# Patient Record
Sex: MALE | Race: WHITE | NOT HISPANIC OR LATINO
[De-identification: names, ages, dates, MRNs, and addresses within clinical notes are randomized per-mention and may not be internally consistent; named-entity substitution may affect disease eponyms.]

---

## 2016-12-27 RX ORDER — SODIUM CHLORIDE 9 MG/ML
3 INJECTION INTRAMUSCULAR; INTRAVENOUS; SUBCUTANEOUS EVERY 8 HOURS
Qty: 0 | Refills: 0 | Status: DISCONTINUED | OUTPATIENT
Start: 2017-01-17 | End: 2017-01-19

## 2017-01-06 ENCOUNTER — OTHER (OUTPATIENT)
Age: 62
End: 2017-01-06

## 2017-01-10 RX ORDER — TRANEXAMIC ACID 100 MG/ML
850 INJECTION, SOLUTION INTRAVENOUS ONCE
Qty: 0 | Refills: 0 | Status: DISCONTINUED | OUTPATIENT
Start: 2017-01-17 | End: 2017-01-19

## 2017-01-10 RX ORDER — BUPIVACAINE 13.3 MG/ML
20 INJECTION, SUSPENSION, LIPOSOMAL INFILTRATION ONCE
Qty: 0 | Refills: 0 | Status: DISCONTINUED | OUTPATIENT
Start: 2017-01-17 | End: 2017-01-19

## 2017-01-10 RX ORDER — CEFAZOLIN SODIUM 1 G
2000 VIAL (EA) INJECTION ONCE
Qty: 0 | Refills: 0 | Status: DISCONTINUED | OUTPATIENT
Start: 2017-01-17 | End: 2017-01-19

## 2017-01-10 RX ORDER — GABAPENTIN 400 MG/1
600 CAPSULE ORAL ONCE
Qty: 0 | Refills: 0 | Status: COMPLETED | OUTPATIENT
Start: 2017-01-17 | End: 2017-01-17

## 2017-01-10 RX ORDER — OXYCODONE HYDROCHLORIDE 5 MG/1
20 TABLET ORAL ONCE
Qty: 0 | Refills: 0 | Status: DISCONTINUED | OUTPATIENT
Start: 2017-01-17 | End: 2017-01-17

## 2017-01-10 RX ORDER — ACETAMINOPHEN 500 MG
1000 TABLET ORAL ONCE
Qty: 0 | Refills: 0 | Status: DISCONTINUED | OUTPATIENT
Start: 2017-01-17 | End: 2017-01-19

## 2017-01-10 RX ORDER — CELECOXIB 200 MG/1
400 CAPSULE ORAL ONCE
Qty: 0 | Refills: 0 | Status: COMPLETED | OUTPATIENT
Start: 2017-01-17 | End: 2017-01-17

## 2017-01-16 ENCOUNTER — RESULT REVIEW (OUTPATIENT)
Age: 62
End: 2017-01-16

## 2017-01-17 ENCOUNTER — APPOINTMENT (OUTPATIENT)
Dept: ORTHOPEDIC SURGERY | Facility: HOSPITAL | Age: 62
End: 2017-01-17

## 2017-01-17 ENCOUNTER — INPATIENT (INPATIENT)
Facility: HOSPITAL | Age: 62
LOS: 1 days | Discharge: NURSING, REHAB OR LONG-TERM CARE | DRG: 470 | End: 2017-01-19
Attending: ORTHOPAEDIC SURGERY | Admitting: ORTHOPAEDIC SURGERY
Payer: COMMERCIAL

## 2017-01-17 VITALS
RESPIRATION RATE: 16 BRPM | OXYGEN SATURATION: 98 % | HEIGHT: 67.5 IN | SYSTOLIC BLOOD PRESSURE: 137 MMHG | DIASTOLIC BLOOD PRESSURE: 96 MMHG | HEART RATE: 88 BPM | TEMPERATURE: 98 F | WEIGHT: 197.98 LBS

## 2017-01-17 DIAGNOSIS — Z98.890 OTHER SPECIFIED POSTPROCEDURAL STATES: Chronic | ICD-10-CM

## 2017-01-17 DIAGNOSIS — M17.12 UNILATERAL PRIMARY OSTEOARTHRITIS, LEFT KNEE: ICD-10-CM

## 2017-01-17 PROCEDURE — 20985 CPTR-ASST DIR MS PX: CPT | Mod: AS,59

## 2017-01-17 PROCEDURE — 73560 X-RAY EXAM OF KNEE 1 OR 2: CPT | Mod: 26,LT

## 2017-01-17 PROCEDURE — 27447 TOTAL KNEE ARTHROPLASTY: CPT | Mod: AS,LT

## 2017-01-17 PROCEDURE — 20985 CPTR-ASST DIR MS PX: CPT | Mod: 59

## 2017-01-17 PROCEDURE — 88311 DECALCIFY TISSUE: CPT | Mod: 26

## 2017-01-17 PROCEDURE — 27447 TOTAL KNEE ARTHROPLASTY: CPT | Mod: LT

## 2017-01-17 PROCEDURE — 88305 TISSUE EXAM BY PATHOLOGIST: CPT | Mod: 26

## 2017-01-17 RX ORDER — FERROUS SULFATE 325(65) MG
325 TABLET ORAL
Qty: 0 | Refills: 0 | Status: DISCONTINUED | OUTPATIENT
Start: 2017-01-17 | End: 2017-01-19

## 2017-01-17 RX ORDER — ACETAMINOPHEN 500 MG
650 TABLET ORAL EVERY 6 HOURS
Qty: 0 | Refills: 0 | Status: DISCONTINUED | OUTPATIENT
Start: 2017-01-17 | End: 2017-01-19

## 2017-01-17 RX ORDER — KETOROLAC TROMETHAMINE 30 MG/ML
15 SYRINGE (ML) INJECTION EVERY 6 HOURS
Qty: 0 | Refills: 0 | Status: DISCONTINUED | OUTPATIENT
Start: 2017-01-17 | End: 2017-01-18

## 2017-01-17 RX ORDER — SODIUM CHLORIDE 9 MG/ML
1000 INJECTION, SOLUTION INTRAVENOUS
Qty: 0 | Refills: 0 | Status: DISCONTINUED | OUTPATIENT
Start: 2017-01-17 | End: 2017-01-18

## 2017-01-17 RX ORDER — HYDROMORPHONE HYDROCHLORIDE 2 MG/ML
0.5 INJECTION INTRAMUSCULAR; INTRAVENOUS; SUBCUTANEOUS
Qty: 0 | Refills: 0 | Status: DISCONTINUED | OUTPATIENT
Start: 2017-01-17 | End: 2017-01-17

## 2017-01-17 RX ORDER — DOCUSATE SODIUM 100 MG
100 CAPSULE ORAL THREE TIMES A DAY
Qty: 0 | Refills: 0 | Status: DISCONTINUED | OUTPATIENT
Start: 2017-01-17 | End: 2017-01-19

## 2017-01-17 RX ORDER — METOPROLOL TARTRATE 50 MG
50 TABLET ORAL DAILY
Qty: 0 | Refills: 0 | Status: DISCONTINUED | OUTPATIENT
Start: 2017-01-17 | End: 2017-01-19

## 2017-01-17 RX ORDER — FOLIC ACID 0.8 MG
1 TABLET ORAL DAILY
Qty: 0 | Refills: 0 | Status: DISCONTINUED | OUTPATIENT
Start: 2017-01-17 | End: 2017-01-19

## 2017-01-17 RX ORDER — OXYCODONE HYDROCHLORIDE 5 MG/1
10 TABLET ORAL EVERY 12 HOURS
Qty: 0 | Refills: 0 | Status: DISCONTINUED | OUTPATIENT
Start: 2017-01-17 | End: 2017-01-19

## 2017-01-17 RX ORDER — OXYCODONE HYDROCHLORIDE 5 MG/1
5 TABLET ORAL
Qty: 0 | Refills: 0 | Status: DISCONTINUED | OUTPATIENT
Start: 2017-01-17 | End: 2017-01-19

## 2017-01-17 RX ORDER — VANCOMYCIN HCL 1 G
1250 VIAL (EA) INTRAVENOUS ONCE
Qty: 0 | Refills: 0 | Status: COMPLETED | OUTPATIENT
Start: 2017-01-17 | End: 2017-01-17

## 2017-01-17 RX ORDER — NICOTINE POLACRILEX 2 MG
1 GUM BUCCAL DAILY
Qty: 0 | Refills: 0 | Status: DISCONTINUED | OUTPATIENT
Start: 2017-01-17 | End: 2017-01-19

## 2017-01-17 RX ORDER — OXYCODONE HYDROCHLORIDE 5 MG/1
10 TABLET ORAL
Qty: 0 | Refills: 0 | Status: DISCONTINUED | OUTPATIENT
Start: 2017-01-17 | End: 2017-01-19

## 2017-01-17 RX ORDER — ONDANSETRON 8 MG/1
4 TABLET, FILM COATED ORAL ONCE
Qty: 0 | Refills: 0 | Status: COMPLETED | OUTPATIENT
Start: 2017-01-17 | End: 2017-01-17

## 2017-01-17 RX ORDER — HYDROMORPHONE HYDROCHLORIDE 2 MG/ML
0.5 INJECTION INTRAMUSCULAR; INTRAVENOUS; SUBCUTANEOUS EVERY 4 HOURS
Qty: 0 | Refills: 0 | Status: DISCONTINUED | OUTPATIENT
Start: 2017-01-17 | End: 2017-01-19

## 2017-01-17 RX ORDER — VANCOMYCIN HCL 1 G
1250 VIAL (EA) INTRAVENOUS
Qty: 0 | Refills: 0 | Status: COMPLETED | OUTPATIENT
Start: 2017-01-17 | End: 2017-01-17

## 2017-01-17 RX ORDER — MAGNESIUM HYDROXIDE 400 MG/1
30 TABLET, CHEWABLE ORAL DAILY
Qty: 0 | Refills: 0 | Status: DISCONTINUED | OUTPATIENT
Start: 2017-01-17 | End: 2017-01-19

## 2017-01-17 RX ORDER — ASPIRIN/CALCIUM CARB/MAGNESIUM 324 MG
325 TABLET ORAL
Qty: 0 | Refills: 0 | Status: DISCONTINUED | OUTPATIENT
Start: 2017-01-18 | End: 2017-01-19

## 2017-01-17 RX ORDER — ACETAMINOPHEN 500 MG
975 TABLET ORAL EVERY 8 HOURS
Qty: 0 | Refills: 0 | Status: DISCONTINUED | OUTPATIENT
Start: 2017-01-17 | End: 2017-01-19

## 2017-01-17 RX ORDER — SODIUM CHLORIDE 9 MG/ML
1000 INJECTION, SOLUTION INTRAVENOUS
Qty: 0 | Refills: 0 | Status: DISCONTINUED | OUTPATIENT
Start: 2017-01-17 | End: 2017-01-17

## 2017-01-17 RX ORDER — CEFAZOLIN SODIUM 1 G
2000 VIAL (EA) INJECTION
Qty: 0 | Refills: 0 | Status: COMPLETED | OUTPATIENT
Start: 2017-01-17 | End: 2017-01-17

## 2017-01-17 RX ORDER — SENNA PLUS 8.6 MG/1
2 TABLET ORAL AT BEDTIME
Qty: 0 | Refills: 0 | Status: DISCONTINUED | OUTPATIENT
Start: 2017-01-17 | End: 2017-01-19

## 2017-01-17 RX ORDER — CELECOXIB 200 MG/1
200 CAPSULE ORAL
Qty: 0 | Refills: 0 | Status: DISCONTINUED | OUTPATIENT
Start: 2017-01-19 | End: 2017-01-19

## 2017-01-17 RX ORDER — ZOLPIDEM TARTRATE 10 MG/1
5 TABLET ORAL AT BEDTIME
Qty: 0 | Refills: 0 | Status: DISCONTINUED | OUTPATIENT
Start: 2017-01-17 | End: 2017-01-19

## 2017-01-17 RX ORDER — FAMOTIDINE 10 MG/ML
40 INJECTION INTRAVENOUS AT BEDTIME
Qty: 0 | Refills: 0 | Status: DISCONTINUED | OUTPATIENT
Start: 2017-01-17 | End: 2017-01-19

## 2017-01-17 RX ORDER — ONDANSETRON 8 MG/1
4 TABLET, FILM COATED ORAL EVERY 6 HOURS
Qty: 0 | Refills: 0 | Status: DISCONTINUED | OUTPATIENT
Start: 2017-01-17 | End: 2017-01-19

## 2017-01-17 RX ADMIN — HYDROMORPHONE HYDROCHLORIDE 0.5 MILLIGRAM(S): 2 INJECTION INTRAMUSCULAR; INTRAVENOUS; SUBCUTANEOUS at 15:18

## 2017-01-17 RX ADMIN — Medication 975 MILLIGRAM(S): at 21:31

## 2017-01-17 RX ADMIN — Medication 100 MILLIGRAM(S): at 18:22

## 2017-01-17 RX ADMIN — HYDROMORPHONE HYDROCHLORIDE 0.5 MILLIGRAM(S): 2 INJECTION INTRAMUSCULAR; INTRAVENOUS; SUBCUTANEOUS at 15:25

## 2017-01-17 RX ADMIN — Medication 15 MILLIGRAM(S): at 18:22

## 2017-01-17 RX ADMIN — ONDANSETRON 4 MILLIGRAM(S): 8 TABLET, FILM COATED ORAL at 18:22

## 2017-01-17 RX ADMIN — HYDROMORPHONE HYDROCHLORIDE 0.5 MILLIGRAM(S): 2 INJECTION INTRAMUSCULAR; INTRAVENOUS; SUBCUTANEOUS at 15:52

## 2017-01-17 RX ADMIN — HYDROMORPHONE HYDROCHLORIDE 0.5 MILLIGRAM(S): 2 INJECTION INTRAMUSCULAR; INTRAVENOUS; SUBCUTANEOUS at 15:29

## 2017-01-17 RX ADMIN — OXYCODONE HYDROCHLORIDE 10 MILLIGRAM(S): 5 TABLET ORAL at 18:23

## 2017-01-17 RX ADMIN — Medication 975 MILLIGRAM(S): at 21:32

## 2017-01-17 RX ADMIN — GABAPENTIN 600 MILLIGRAM(S): 400 CAPSULE ORAL at 10:57

## 2017-01-17 RX ADMIN — HYDROMORPHONE HYDROCHLORIDE 0.5 MILLIGRAM(S): 2 INJECTION INTRAMUSCULAR; INTRAVENOUS; SUBCUTANEOUS at 15:00

## 2017-01-17 RX ADMIN — Medication 166.67 MILLIGRAM(S): at 11:06

## 2017-01-17 RX ADMIN — OXYCODONE HYDROCHLORIDE 10 MILLIGRAM(S): 5 TABLET ORAL at 18:45

## 2017-01-17 RX ADMIN — FAMOTIDINE 40 MILLIGRAM(S): 10 INJECTION INTRAVENOUS at 21:31

## 2017-01-17 RX ADMIN — HYDROMORPHONE HYDROCHLORIDE 0.5 MILLIGRAM(S): 2 INJECTION INTRAMUSCULAR; INTRAVENOUS; SUBCUTANEOUS at 15:50

## 2017-01-17 RX ADMIN — Medication 100 MILLIGRAM(S): at 23:46

## 2017-01-17 RX ADMIN — CELECOXIB 400 MILLIGRAM(S): 200 CAPSULE ORAL at 11:30

## 2017-01-17 RX ADMIN — OXYCODONE HYDROCHLORIDE 20 MILLIGRAM(S): 5 TABLET ORAL at 11:30

## 2017-01-17 RX ADMIN — SODIUM CHLORIDE 3 MILLILITER(S): 9 INJECTION INTRAMUSCULAR; INTRAVENOUS; SUBCUTANEOUS at 21:32

## 2017-01-17 RX ADMIN — Medication 1 PATCH: at 21:30

## 2017-01-17 RX ADMIN — HYDROMORPHONE HYDROCHLORIDE 0.5 MILLIGRAM(S): 2 INJECTION INTRAMUSCULAR; INTRAVENOUS; SUBCUTANEOUS at 15:10

## 2017-01-17 RX ADMIN — ONDANSETRON 4 MILLIGRAM(S): 8 TABLET, FILM COATED ORAL at 14:59

## 2017-01-17 RX ADMIN — Medication 100 MILLIGRAM(S): at 21:31

## 2017-01-17 RX ADMIN — OXYCODONE HYDROCHLORIDE 20 MILLIGRAM(S): 5 TABLET ORAL at 10:56

## 2017-01-17 RX ADMIN — SODIUM CHLORIDE 100 MILLILITER(S): 9 INJECTION, SOLUTION INTRAVENOUS at 14:59

## 2017-01-17 RX ADMIN — CELECOXIB 400 MILLIGRAM(S): 200 CAPSULE ORAL at 10:56

## 2017-01-17 RX ADMIN — Medication 15 MILLIGRAM(S): at 18:45

## 2017-01-17 NOTE — PHYSICAL THERAPY INITIAL EVALUATION ADULT - ADDITIONAL COMMENTS
Pt lives in a private home with 2 roommates, No steps to enter, no steps inside. Pt was independent PTA without assist device. Pt owns [employer].

## 2017-01-17 NOTE — PROGRESS NOTE ADULT - SUBJECTIVE AND OBJECTIVE BOX
JENNIFER MENDES  [id number]    History: 61y Male is status post left total knee arthroplasty on 1/17/17, POD # 0. Patient is doing well and is comfortable. The patient's pain is controlled using the prescribed pain medications. The patient is participating in physical therapy. Denies nausea, vomiting, chest pain, shortness of breath, abdominal pain or fever. No new complaints.    MEDICATIONS  (STANDING):  sodium chloride 0.9% lock flush 3milliLiter(s) IV Push every 8 hours  tranexamic acid IVPB 850milliGRAM(s) IV Intermittent once  tranexamic acid IVPB 850milliGRAM(s) IV Intermittent once  BUpivacaine liposome 1.3% Injectable 20milliLiter(s) Local Injection once  acetaminophen  IVPB. 1000milliGRAM(s) IV Intermittent once  ceFAZolin   IVPB 2000milliGRAM(s) IV Intermittent once  lactated ringers. 1000milliLiter(s) IV Continuous <Continuous>  acetaminophen   Tablet. 975milliGRAM(s) Oral every 8 hours  ketorolac   Injectable 15milliGRAM(s) IV Push every 6 hours  docusate sodium 100milliGRAM(s) Oral three times a day  ferrous    sulfate 325milliGRAM(s) Oral three times a day with meals  folic acid 1milliGRAM(s) Oral daily  multivitamin 1Tablet(s) Oral daily  oxyCODONE ER Tablet 10milliGRAM(s) Oral every 12 hours  metoprolol 50milliGRAM(s) Oral daily  famotidine    Tablet 40milliGRAM(s) Oral at bedtime  vancomycin  IVPB 1250milliGRAM(s) IV Intermittent <User Schedule>  ceFAZolin   IVPB 2000milliGRAM(s) IV Intermittent <User Schedule>  nicotine -  14 mG/24Hr(s) Patch 1patch Transdermal daily    MEDICATIONS  (PRN):  acetaminophen   Tablet 650milliGRAM(s) Oral every 6 hours PRN For Temp over 38.3 C (100.94 F)  oxyCODONE IR 5milliGRAM(s) Oral every 3 hours PRN Mild Pain  oxyCODONE IR 10milliGRAM(s) Oral every 3 hours PRN Moderate Pain  HYDROmorphone  Injectable 0.5milliGRAM(s) IV Push every 4 hours PRN Severe Pain/breakthough pain  aluminum hydroxide/magnesium hydroxide/simethicone Suspension 30milliLiter(s) Oral four times a day PRN Indigestion  ondansetron Injectable 4milliGRAM(s) IV Push every 6 hours PRN Nausea and/or Vomiting  magnesium hydroxide Suspension 30milliLiter(s) Oral daily PRN Constipation  senna 2Tablet(s) Oral at bedtime PRN Constipation  zolpidem 5milliGRAM(s) Oral at bedtime PRN Insomnia  zolpidem 5milliGRAM(s) Oral at bedtime PRN Insomnia      Physical exam: The left knee dressing is clean, dry and intact. No drainage or discharge. The calf is supple nontender. Sensation to light touch is grossly intact distally. Motor function distally is 5/5. Extensor hallucis longus and flexor hallucis longus are intact. No foot drop. 2+ dorsalis pedis pulse. Capillary refill is less than 2 seconds. No cyanosis.    Primary Orthopedic Assessment:  •	s/p LEFT total knee replacement    Plan:   •	DVT prophylaxis as prescribed, including use of compression devices and ankle pumps  •	Continue physical therapy  •	Weightbearing as tolerated of the left lower extremity with assistance of a walker  •	Incentive spirometry encouraged  •	Pain control as clinically indicated  •	Discharge planning

## 2017-01-17 NOTE — PHYSICAL THERAPY INITIAL EVALUATION ADULT - RANGE OF MOTION EXAMINATION, REHAB EVAL
Right LE ROM was WFL (within functional limits)/bilateral upper extremity ROM was WFL (within functional limits)/left knee lacking approx 15-20 degrees extension to approx 65-70 degrees flexion

## 2017-01-17 NOTE — PHYSICAL THERAPY INITIAL EVALUATION ADULT - CRITERIA FOR SKILLED THERAPEUTIC INTERVENTIONS
home with home PT, RW (pending progress)/impairments found/anticipated discharge recommendation/anticipated equipment needs at discharge

## 2017-01-17 NOTE — PHYSICAL THERAPY INITIAL EVALUATION ADULT - GENERAL OBSERVATIONS, REHAB EVAL
pt received supine on stretcher in PACU, + telemetry + IV + O2nc + Venous Compression Boots pt c/o 7.5/10 pain, agreeable to PT

## 2017-01-18 ENCOUNTER — TRANSCRIPTION ENCOUNTER (OUTPATIENT)
Age: 62
End: 2017-01-18

## 2017-01-18 DIAGNOSIS — M17.12 UNILATERAL PRIMARY OSTEOARTHRITIS, LEFT KNEE: ICD-10-CM

## 2017-01-18 DIAGNOSIS — F17.200 NICOTINE DEPENDENCE, UNSPECIFIED, UNCOMPLICATED: ICD-10-CM

## 2017-01-18 DIAGNOSIS — K21.9 GASTRO-ESOPHAGEAL REFLUX DISEASE WITHOUT ESOPHAGITIS: ICD-10-CM

## 2017-01-18 DIAGNOSIS — F31.9 BIPOLAR DISORDER, UNSPECIFIED: ICD-10-CM

## 2017-01-18 DIAGNOSIS — G47.33 OBSTRUCTIVE SLEEP APNEA (ADULT) (PEDIATRIC): ICD-10-CM

## 2017-01-18 DIAGNOSIS — R79.89 OTHER SPECIFIED ABNORMAL FINDINGS OF BLOOD CHEMISTRY: ICD-10-CM

## 2017-01-18 DIAGNOSIS — D62 ACUTE POSTHEMORRHAGIC ANEMIA: ICD-10-CM

## 2017-01-18 DIAGNOSIS — D72.829 ELEVATED WHITE BLOOD CELL COUNT, UNSPECIFIED: ICD-10-CM

## 2017-01-18 DIAGNOSIS — I10 ESSENTIAL (PRIMARY) HYPERTENSION: ICD-10-CM

## 2017-01-18 DIAGNOSIS — Z96.652 PRESENCE OF LEFT ARTIFICIAL KNEE JOINT: ICD-10-CM

## 2017-01-18 LAB
ANION GAP SERPL CALC-SCNC: 14 MMOL/L — SIGNIFICANT CHANGE UP (ref 5–17)
BLD GP AB SCN SERPL QL: SIGNIFICANT CHANGE UP
BUN SERPL-MCNC: 18 MG/DL — SIGNIFICANT CHANGE UP (ref 8–20)
CALCIUM SERPL-MCNC: 8.9 MG/DL — SIGNIFICANT CHANGE UP (ref 8.6–10.2)
CHLORIDE SERPL-SCNC: 98 MMOL/L — SIGNIFICANT CHANGE UP (ref 98–107)
CO2 SERPL-SCNC: 25 MMOL/L — SIGNIFICANT CHANGE UP (ref 22–29)
CREAT SERPL-MCNC: 1.39 MG/DL — HIGH (ref 0.5–1.3)
GLUCOSE SERPL-MCNC: 107 MG/DL — SIGNIFICANT CHANGE UP (ref 70–115)
HCT VFR BLD CALC: 32.3 % — LOW (ref 42–52)
HGB BLD-MCNC: 11.2 G/DL — LOW (ref 14–18)
MCHC RBC-ENTMCNC: 31.5 PG — HIGH (ref 27–31)
MCHC RBC-ENTMCNC: 34.7 G/DL — SIGNIFICANT CHANGE UP (ref 32–36)
MCV RBC AUTO: 90.7 FL — SIGNIFICANT CHANGE UP (ref 80–94)
PLATELET # BLD AUTO: 227 K/UL — SIGNIFICANT CHANGE UP (ref 150–400)
POTASSIUM SERPL-MCNC: 3.8 MMOL/L — SIGNIFICANT CHANGE UP (ref 3.5–5.3)
POTASSIUM SERPL-SCNC: 3.8 MMOL/L — SIGNIFICANT CHANGE UP (ref 3.5–5.3)
RBC # BLD: 3.56 M/UL — LOW (ref 4.6–6.2)
RBC # FLD: 12.6 % — SIGNIFICANT CHANGE UP (ref 11–15.6)
SODIUM SERPL-SCNC: 137 MMOL/L — SIGNIFICANT CHANGE UP (ref 135–145)
TYPE + AB SCN PNL BLD: SIGNIFICANT CHANGE UP
WBC # BLD: 13.97 K/UL — HIGH (ref 4.8–10.8)
WBC # FLD AUTO: 13.97 K/UL — HIGH (ref 4.8–10.8)

## 2017-01-18 PROCEDURE — 99223 1ST HOSP IP/OBS HIGH 75: CPT

## 2017-01-18 RX ORDER — NICOTINE POLACRILEX 2 MG
1 GUM BUCCAL DAILY
Qty: 0 | Refills: 0 | Status: CANCELLED | OUTPATIENT
Start: 2017-12-17 | End: 2017-01-19

## 2017-01-18 RX ORDER — ASPIRIN/CALCIUM CARB/MAGNESIUM 324 MG
1 TABLET ORAL
Qty: 84 | Refills: 0 | OUTPATIENT
Start: 2017-01-18

## 2017-01-18 RX ORDER — INFLUENZA VIRUS VACCINE 15; 15; 15; 15 UG/.5ML; UG/.5ML; UG/.5ML; UG/.5ML
0.5 SUSPENSION INTRAMUSCULAR ONCE
Qty: 0 | Refills: 0 | Status: DISCONTINUED | OUTPATIENT
Start: 2017-01-18 | End: 2017-01-19

## 2017-01-18 RX ORDER — CALCIUM CARBONATE 500(1250)
2 TABLET ORAL EVERY 4 HOURS
Qty: 0 | Refills: 0 | Status: DISCONTINUED | OUTPATIENT
Start: 2017-01-18 | End: 2017-01-19

## 2017-01-18 RX ORDER — OXYCODONE HYDROCHLORIDE 5 MG/1
1 TABLET ORAL
Qty: 60 | Refills: 0 | OUTPATIENT
Start: 2017-01-18

## 2017-01-18 RX ORDER — DOCUSATE SODIUM 100 MG
1 CAPSULE ORAL
Qty: 60 | Refills: 0 | OUTPATIENT
Start: 2017-01-18

## 2017-01-18 RX ORDER — SODIUM CHLORIDE 9 MG/ML
1000 INJECTION, SOLUTION INTRAVENOUS
Qty: 0 | Refills: 0 | Status: DISCONTINUED | OUTPATIENT
Start: 2017-01-18 | End: 2017-01-19

## 2017-01-18 RX ADMIN — HYDROMORPHONE HYDROCHLORIDE 0.5 MILLIGRAM(S): 2 INJECTION INTRAMUSCULAR; INTRAVENOUS; SUBCUTANEOUS at 14:39

## 2017-01-18 RX ADMIN — SODIUM CHLORIDE 3 MILLILITER(S): 9 INJECTION INTRAMUSCULAR; INTRAVENOUS; SUBCUTANEOUS at 20:06

## 2017-01-18 RX ADMIN — Medication 325 MILLIGRAM(S): at 09:26

## 2017-01-18 RX ADMIN — Medication 975 MILLIGRAM(S): at 05:13

## 2017-01-18 RX ADMIN — Medication 100 MILLIGRAM(S): at 20:08

## 2017-01-18 RX ADMIN — Medication 325 MILLIGRAM(S): at 05:11

## 2017-01-18 RX ADMIN — SODIUM CHLORIDE 100 MILLILITER(S): 9 INJECTION, SOLUTION INTRAVENOUS at 17:34

## 2017-01-18 RX ADMIN — OXYCODONE HYDROCHLORIDE 10 MILLIGRAM(S): 5 TABLET ORAL at 11:21

## 2017-01-18 RX ADMIN — Medication 15 MILLIGRAM(S): at 00:25

## 2017-01-18 RX ADMIN — FAMOTIDINE 40 MILLIGRAM(S): 10 INJECTION INTRAVENOUS at 20:08

## 2017-01-18 RX ADMIN — Medication 975 MILLIGRAM(S): at 13:38

## 2017-01-18 RX ADMIN — Medication 975 MILLIGRAM(S): at 14:39

## 2017-01-18 RX ADMIN — Medication 975 MILLIGRAM(S): at 20:09

## 2017-01-18 RX ADMIN — SODIUM CHLORIDE 100 MILLILITER(S): 9 INJECTION, SOLUTION INTRAVENOUS at 05:13

## 2017-01-18 RX ADMIN — OXYCODONE HYDROCHLORIDE 10 MILLIGRAM(S): 5 TABLET ORAL at 05:11

## 2017-01-18 RX ADMIN — HYDROMORPHONE HYDROCHLORIDE 0.5 MILLIGRAM(S): 2 INJECTION INTRAMUSCULAR; INTRAVENOUS; SUBCUTANEOUS at 13:13

## 2017-01-18 RX ADMIN — Medication 975 MILLIGRAM(S): at 20:11

## 2017-01-18 RX ADMIN — Medication 325 MILLIGRAM(S): at 17:32

## 2017-01-18 RX ADMIN — OXYCODONE HYDROCHLORIDE 10 MILLIGRAM(S): 5 TABLET ORAL at 19:17

## 2017-01-18 RX ADMIN — Medication 1 PATCH: at 20:11

## 2017-01-18 RX ADMIN — Medication 1 PATCH: at 13:37

## 2017-01-18 RX ADMIN — Medication 1 TABLET(S): at 11:23

## 2017-01-18 RX ADMIN — Medication 325 MILLIGRAM(S): at 13:21

## 2017-01-18 RX ADMIN — SODIUM CHLORIDE 3 MILLILITER(S): 9 INJECTION INTRAMUSCULAR; INTRAVENOUS; SUBCUTANEOUS at 13:40

## 2017-01-18 RX ADMIN — Medication 100 MILLIGRAM(S): at 13:37

## 2017-01-18 RX ADMIN — Medication 15 MILLIGRAM(S): at 00:23

## 2017-01-18 RX ADMIN — SODIUM CHLORIDE 100 MILLILITER(S): 9 INJECTION, SOLUTION INTRAVENOUS at 20:07

## 2017-01-18 RX ADMIN — OXYCODONE HYDROCHLORIDE 10 MILLIGRAM(S): 5 TABLET ORAL at 17:32

## 2017-01-18 RX ADMIN — Medication 100 MILLIGRAM(S): at 05:11

## 2017-01-18 RX ADMIN — OXYCODONE HYDROCHLORIDE 10 MILLIGRAM(S): 5 TABLET ORAL at 20:08

## 2017-01-18 RX ADMIN — OXYCODONE HYDROCHLORIDE 10 MILLIGRAM(S): 5 TABLET ORAL at 12:00

## 2017-01-18 RX ADMIN — OXYCODONE HYDROCHLORIDE 10 MILLIGRAM(S): 5 TABLET ORAL at 05:13

## 2017-01-18 RX ADMIN — Medication 30 MILLILITER(S): at 16:43

## 2017-01-18 RX ADMIN — SODIUM CHLORIDE 3 MILLILITER(S): 9 INJECTION INTRAMUSCULAR; INTRAVENOUS; SUBCUTANEOUS at 05:13

## 2017-01-18 RX ADMIN — OXYCODONE HYDROCHLORIDE 10 MILLIGRAM(S): 5 TABLET ORAL at 20:43

## 2017-01-18 RX ADMIN — Medication 1 MILLIGRAM(S): at 11:23

## 2017-01-18 RX ADMIN — Medication 166.67 MILLIGRAM(S): at 00:23

## 2017-01-18 RX ADMIN — Medication 975 MILLIGRAM(S): at 05:12

## 2017-01-18 NOTE — DISCHARGE NOTE ADULT - MEDICATION SUMMARY - MEDICATIONS TO TAKE
I will START or STAY ON the medications listed below when I get home from the hospital:    dme  -- rolling walker  -- Indication: For assistance    dme  -- 3 in 1 commode  -- Indication: For assistance    oxyCODONE 5 mg oral tablet  -- 1-2 tab(s) by mouth every 4 to 6 hours, As Needed, Pain MDD:8  -- Indication: For Pain    aspirin 325 mg oral delayed release tablet  -- 1 tab(s) by mouth 2 times a day  -- Indication: For dvtp    Ambien 10 mg oral tablet  -- 1 tab(s) by mouth once a day (at bedtime)  -- Indication: For home med    metoprolol tartrate 50 mg oral tablet  -- 1 tab(s) by mouth once a day  -- Indication: For home med    Pepcid 40 mg oral tablet  -- 1 tab(s) by mouth once a day (at bedtime)  -- Indication: For home med    docusate sodium 100 mg oral capsule  -- 1 cap(s) by mouth 2 times a day  -- Indication: For constipation

## 2017-01-18 NOTE — DISCHARGE NOTE ADULT - CARE PLAN
Principal Discharge DX:	Status post total left knee replacement  Goal:	perform ADL's without difficulty or limitation  Instructions for follow-up, activity and diet:	The patient will be seen in the office in 3 weeks for wound check. Tape will be removed at that time. Patient may shower after post-op day #3. The dressing is to be removed on Post- op day#10. IF THE DRESSING BECOMES SOILED BEFORE THE REMOVAL DATE, CHANGE WITH A SIMILAR DRESSING. IF THE DRESSING BECOMES STAINED WITH DISCHARGE, CONTACT THE OFFICE FOR FURTHER DIRECTIONS. The patient will contact the office if the wound becomes red, has increasing pain, develops bleeding or discharge, an injury occurs, or has other concerns. The patient will continue PT consistent with total knee replacement. The patient will continue aspirin 325mg twice daily for 6 weeks for blood clot prevention. The patient will take OXYCODONE AND TYLENOL for pain control and titrate according to prescription and patient needs. The patient will take Colace while taking oxycodone to prevent narcotic associated constipation.  Additionally, increase water intake (drink at least 8 glasses of water daily) and try adding fiber to the diet by eating fruits, vegetables and foods that are rich in grains. If constipation is experienced, contact the medical/primary care provider to discuss further treatment options. The patient is FULL weight bearing. Elevation of the lower leg is recommended to reduce swelling.

## 2017-01-18 NOTE — DISCHARGE NOTE ADULT - PLAN OF CARE
perform ADL's without difficulty or limitation The patient will be seen in the office in 3 weeks for wound check. Tape will be removed at that time. Patient may shower after post-op day #3. The dressing is to be removed on Post- op day#10. IF THE DRESSING BECOMES SOILED BEFORE THE REMOVAL DATE, CHANGE WITH A SIMILAR DRESSING. IF THE DRESSING BECOMES STAINED WITH DISCHARGE, CONTACT THE OFFICE FOR FURTHER DIRECTIONS. The patient will contact the office if the wound becomes red, has increasing pain, develops bleeding or discharge, an injury occurs, or has other concerns. The patient will continue PT consistent with total knee replacement. The patient will continue aspirin 325mg twice daily for 6 weeks for blood clot prevention. The patient will take OXYCODONE AND TYLENOL for pain control and titrate according to prescription and patient needs. The patient will take Colace while taking oxycodone to prevent narcotic associated constipation.  Additionally, increase water intake (drink at least 8 glasses of water daily) and try adding fiber to the diet by eating fruits, vegetables and foods that are rich in grains. If constipation is experienced, contact the medical/primary care provider to discuss further treatment options. The patient is FULL weight bearing. Elevation of the lower leg is recommended to reduce swelling.

## 2017-01-18 NOTE — DISCHARGE NOTE ADULT - PATIENT PORTAL LINK FT
“You can access the Follow[employer] Patient Portal, offered by [facility], by registering with the following website: [email or web address]”

## 2017-01-18 NOTE — DISCHARGE NOTE ADULT - HOSPITAL COURSE
The patient underwent a LEFT TOTAL KNEE REPLACEMENT on 1/17/17. The patient received antibiotics consistent with SCIP guidelines. The patient underwent the procedure and had no intra-operative complications. Post-operatively, the patient was seen by medicine and PT. The patient received ASPIRIN for DVTP. The patient received pain medications per orthopedic pain managment protocol and the pain was appropriately controlled. The patient did not have any post-operative medical complications. The patient was discharged in stable condition.

## 2017-01-18 NOTE — DISCHARGE NOTE ADULT - MEDICATION SUMMARY - MEDICATIONS TO STOP TAKING
I will STOP taking the medications listed below when I get home from the hospital:    ibuprofen 800 mg oral tablet  -- 1 tab(s) by mouth 3 times a day

## 2017-01-18 NOTE — PROGRESS NOTE ADULT - SUBJECTIVE AND OBJECTIVE BOX
JENNIFER MENDES  [id number]    History: 61y Male is status post left total knee arthroplasty on 1/17/2017, POD # 01. Patient is doing well and is comfortable. The patient's pain is controlled using the prescribed pain medications. The patient was not feeling up to participating in PT yesterday.  Pt was c/o nausea yesterday but this has resolved.  Denies vomiting, chest pain, shortness of breath, abdominal pain or fever. No new complaints.                              11.2   13.97 )-----------( 227      ( 18 Jan 2017 05:58 )             32.3     18 Jan 2017 05:58    137    |  98     |  18.0   ----------------------------<  107    3.8     |  25.0   |  1.39     Ca    8.9        18 Jan 2017 05:58        Vital Signs Last 24 Hrs  T(C): 36.7, Max: 36.9 (01-17 @ 16:51)  T(F): 98, Max: 98.5 (01-17 @ 16:51)  HR: 71 (71 - 91)  BP: 106/59 (106/59 - 162/66)  BP(mean): 94 (85 - 94)  RR: 16 (14 - 18)  SpO2: 100% (94% - 100%)      Physical exam: The left knee dressing is clean, dry and intact. No drainage or discharge. No erythema is noted. No blistering. No ecchymosis. The calf is supple nontender. Passive range of motion is acceptable to due postoperative pain. No calf tenderness. Sensation to light touch is grossly intact distally. Motor function distally is 5/5. Extensor hallucis longus and flexor hallucis longus are intact. No foot drop. 2+ dorsalis pedis pulse. Capillary refill is less than 2 seconds. No cyanosis.    Primary Orthopedic Assessment:  •	s/p LEFT total knee replacement pod #1    •	    Plan:   •	DVT prophylaxis as prescribed, including use of compression devices and ankle pumps  •	Continue physical therapy  •	Weightbearing as tolerated of the right lower extremity with assistance of a walker  •	Incentive spirometry encouraged  •	Pain control as clinically indicated  •	Discharge planning – anticipated discharge is Home Thursday/Friday

## 2017-01-18 NOTE — DISCHARGE NOTE ADULT - CARE PROVIDER_API CALL
Swapnil Church), Orthopaedic Surgery  [address] [place] [address]  Phone: [phone]  Fax: [phone]

## 2017-01-18 NOTE — DISCHARGE NOTE ADULT - CARE PROVIDERS DIRECT ADDRESSES
[email or web address]

## 2017-01-18 NOTE — CONSULT NOTE ADULT - SUBJECTIVE AND OBJECTIVE BOX
PATIENT SEEN AND EXAMINED . S/P L TKA , POD #1     HPI: 62 y/o male with h/o left knee osteoarthritis x ray shows DJD patient now S/P L TKA , POD #1    PAST MEDICAL & SURGICAL HISTORY:  History of lipoma  Bipolar 1 disorder  HUNTER on CPAP  Insomnia due to stress  GERD (gastroesophageal reflux disease)  Hypertension  H/O left inguinal hernia repair: 1966  History of lumbar laminectomy: 2008      MEDICATIONS  (STANDING):    sodium chloride 0.9% lock flush 3milliLiter(s) IV Push every 8 hours  tranexamic acid IVPB 850milliGRAM(s) IV Intermittent once  tranexamic acid IVPB 850milliGRAM(s) IV Intermittent once  BUpivacaine liposome 1.3% Injectable 20milliLiter(s) Local Injection once  acetaminophen  IVPB. 1000milliGRAM(s) IV Intermittent once  ceFAZolin   IVPB 2000milliGRAM(s) IV Intermittent once  lactated ringers. 1000milliLiter(s) IV Continuous <Continuous>  aspirin enteric coated 325milliGRAM(s) Oral two times a day  acetaminophen   Tablet. 975milliGRAM(s) Oral every 8 hours  docusate sodium 100milliGRAM(s) Oral three times a day  ferrous    sulfate 325milliGRAM(s) Oral three times a day with meals  folic acid 1milliGRAM(s) Oral daily  multivitamin 1Tablet(s) Oral daily  oxyCODONE ER Tablet 10milliGRAM(s) Oral every 12 hours  metoprolol 50milliGRAM(s) Oral daily  famotidine    Tablet 40milliGRAM(s) Oral at bedtime  nicotine -  14 mG/24Hr(s) Patch 1patch Transdermal daily  influenza   Vaccine 0.5milliLiter(s) IntraMuscular once    MEDICATIONS  (PRN):  acetaminophen   Tablet 650milliGRAM(s) Oral every 6 hours PRN For Temp over 38.3 C (100.94 F)  oxyCODONE IR 5milliGRAM(s) Oral every 3 hours PRN Mild Pain  oxyCODONE IR 10milliGRAM(s) Oral every 3 hours PRN Moderate Pain  HYDROmorphone  Injectable 0.5milliGRAM(s) IV Push every 4 hours PRN Severe Pain/breakthough pain  aluminum hydroxide/magnesium hydroxide/simethicone Suspension 30milliLiter(s) Oral four times a day PRN Indigestion  ondansetron Injectable 4milliGRAM(s) IV Push every 6 hours PRN Nausea and/or Vomiting  magnesium hydroxide Suspension 30milliLiter(s) Oral daily PRN Constipation  senna 2Tablet(s) Oral at bedtime PRN Constipation  zolpidem 5milliGRAM(s) Oral at bedtime PRN Insomnia  zolpidem 5milliGRAM(s) Oral at bedtime PRN Insomnia      LABS:                          11.2   13.97 )-----------( 227      ( 18 Jan 2017 05:58 )             32.3     18 Jan 2017 05:58    137    |  98     |  18.0   ----------------------------<  107    3.8     |  25.0   |  1.39     Ca    8.9        18 Jan 2017 05:58            RADIOLOGY & ADDITIONAL TESTS:     EXAM:  KNEE-LEFT                          PROCEDURE DATE:  01/17/2017        INTERPRETATION:  Left knee    AP and lateral view    Status post left knee replacement. The alignment is normal. The   prosthetic components are in good position. Postoperative gas and fluid   is present in the suprapatellar recess.    Impression: Left knee replacement in good position      REVIEW OF SYSTEMS:    CONSTITUTIONAL: No fever, weight loss, or fatigue  EYES: No eye pain, visual disturbances, or discharge  ENMT:  No difficulty hearing, tinnitus, vertigo; No sinus or throat pain  NECK: No pain or stiffness  RESPIRATORY: No cough, wheezing, chills or hemoptysis; No shortness of breath  CARDIOVASCULAR: No chest pain, palpitations, dizziness, or leg swelling  GASTROINTESTINAL: No abdominal or epigastric pain. No nausea, vomiting, or hematemesis; No diarrhea or constipation. No melena or hematochezia.  GENITOURINARY: No dysuria, frequency, hematuria, or incontinence  NEUROLOGICAL: No headaches, memory loss, loss of strength, numbness, or tremors  SKIN: No itching, burning, rashes, or lesions   LYMPH NODES: No enlarged glands  ENDOCRINE: No heat or cold intolerance; No hair loss  MUSCULOSKELETAL: L knee pain  PSYCHIATRIC: No depression, anxiety, mood swings, or difficulty sleeping  HEME/LYMPH: No easy bruising, or bleeding gums  ALLERGY AND IMMUNOLOGIC: No hives or eczema    Vital Signs Last 24 Hrs  T(C): 36.7, Max: 36.9 (01-17 @ 16:51)  T(F): 98, Max: 98.5 (01-17 @ 16:51)  HR: 71 (71 - 91)  BP: 106/59 (106/59 - 162/66)  BP(mean): 94 (85 - 94)  RR: 16 (14 - 18)  SpO2: 100% (94% - 100%)  PHYSICAL EXAM:    GENERAL: NAD, well-groomed, well-developed  HEAD:  Atraumatic, Normocephalic  EYES: EOMI, PERRLA, conjunctiva and sclera clear  NECK: Supple, No JVD, Normal thyroid  NERVOUS SYSTEM:  Alert & Oriented X3, no focal deficit  CHEST/LUNG: CTA b/l ,  no  rales, rhonchi, wheezing, or rubs  HEART: Regular rate and rhythm; No murmurs, rubs, or gallops  ABDOMEN: Soft, Nontender, Nondistended; Bowel sounds present  EXTREMITIES:  2+ Peripheral Pulses, No clubbing, cyanosis, or edema , L knee dressing + , C/D/I  LYMPH: No lymphadenopathy noted  SKIN: No rashes or lesions Patient seen and examined . S/P L TKA , POD #1 , pain well controlled , c/o mild HA , no n/v    HPI: 62 y/o male with h/o left knee pain/ osteoarthritis  for about 20 yrs , now pain worst ,x ray shows DJD , now S/P L TKA , POD #1    CC: l knee pain - well controlled with pain meds , mild HA =    PAST MEDICAL & SURGICAL HISTORY:  History of lipoma  Bipolar 1 disorder  HUNTER not on CPAP  Insomnia due to stress  GERD (gastroesophageal reflux disease)  Hypertension  H/O left inguinal hernia repair:   History of lumbar laminectomy:     SOCIAL HISTORY:     Tobacco - smoker , smoks  - 2 PPD for 40yrs  ETOH - denies  Drugs - Ex - cocaine abuser ( use to sniff cocaine )- clean for 10 yrs , denies  IVDA     FAMILY HISTORY; Father with CAD/MI -  at 50 yrs old/ multiple stomach ulcers , no Hx of DM / Cancer      MEDICATIONS  (STANDING):    sodium chloride 0.9% lock flush 3milliLiter(s) IV Push every 8 hours  tranexamic acid IVPB 850milliGRAM(s) IV Intermittent once  tranexamic acid IVPB 850milliGRAM(s) IV Intermittent once  BUpivacaine liposome 1.3% Injectable 20milliLiter(s) Local Injection once  acetaminophen  IVPB. 1000milliGRAM(s) IV Intermittent once  ceFAZolin   IVPB 2000milliGRAM(s) IV Intermittent once  lactated ringers. 1000milliLiter(s) IV Continuous <Continuous>  aspirin enteric coated 325milliGRAM(s) Oral two times a day  acetaminophen   Tablet. 975milliGRAM(s) Oral every 8 hours  docusate sodium 100milliGRAM(s) Oral three times a day  ferrous    sulfate 325milliGRAM(s) Oral three times a day with meals  folic acid 1milliGRAM(s) Oral daily  multivitamin 1Tablet(s) Oral daily  oxyCODONE ER Tablet 10milliGRAM(s) Oral every 12 hours  metoprolol 50milliGRAM(s) Oral daily  famotidine    Tablet 40milliGRAM(s) Oral at bedtime  nicotine -  14 mG/24Hr(s) Patch 1patch Transdermal daily  influenza   Vaccine 0.5milliLiter(s) IntraMuscular once    MEDICATIONS  (PRN):  acetaminophen   Tablet 650milliGRAM(s) Oral every 6 hours PRN For Temp over 38.3 C (100.94 F)  oxyCODONE IR 5milliGRAM(s) Oral every 3 hours PRN Mild Pain  oxyCODONE IR 10milliGRAM(s) Oral every 3 hours PRN Moderate Pain  HYDROmorphone  Injectable 0.5milliGRAM(s) IV Push every 4 hours PRN Severe Pain/breakthough pain  aluminum hydroxide/magnesium hydroxide/simethicone Suspension 30milliLiter(s) Oral four times a day PRN Indigestion  ondansetron Injectable 4milliGRAM(s) IV Push every 6 hours PRN Nausea and/or Vomiting  magnesium hydroxide Suspension 30milliLiter(s) Oral daily PRN Constipation  senna 2Tablet(s) Oral at bedtime PRN Constipation  zolpidem 5milliGRAM(s) Oral at bedtime PRN Insomnia  zolpidem 5milliGRAM(s) Oral at bedtime PRN Insomnia      LABS:                          11.2   13.97 )-----------( 227      ( 2017 05:58 )             32.3     2017 05:58    137    |  98     |  18.0   ----------------------------<  107    3.8     |  25.0   |  1.39     Ca    8.9        2017 05:58            RADIOLOGY & ADDITIONAL TESTS:     EXAM:  KNEE-LEFT                          PROCEDURE DATE:  2017        INTERPRETATION:  Left knee    AP and lateral view    Status post left knee replacement. The alignment is normal. The   prosthetic components are in good position. Postoperative gas and fluid   is present in the suprapatellar recess.    Impression: Left knee replacement in good position      REVIEW OF SYSTEMS:    CONSTITUTIONAL: No fever, weight loss, or fatigue  EYES: No eye pain, visual disturbances, or discharge  ENMT:  No difficulty hearing, tinnitus, vertigo; No sinus or throat pain  NECK: No pain or stiffness  Anterior chest - lipoma +  RESPIRATORY: No cough, wheezing, chills or hemoptysis; No shortness of breath  CARDIOVASCULAR: No chest pain, palpitations, dizziness, or leg swelling  GASTROINTESTINAL: No abdominal or epigastric pain. No nausea, vomiting, or hematemesis; No diarrhea or constipation. No melena or hematochezia.  GENITOURINARY: No dysuria, frequency, hematuria, or incontinence  NEUROLOGICAL: mild  headaches+, no memory loss, loss of strength, numbness, or tremors  SKIN: No itching, burning, rashes, or lesions   LYMPH NODES: No enlarged glands  ENDOCRINE: No heat or cold intolerance; No hair loss  MUSCULOSKELETAL: L knee pain , well controlled with pain meds  PSYCHIATRIC: No depression, anxiety, mood swings, difficulty sleeping+ , hx of Bipolar  HEME/LYMPH: No easy bruising, or bleeding gums  ALLERGY AND IMMUNOLOGIC: No hives or eczema    Vital Signs Last 24 Hrs  T(C): 36.7, Max: 36.9 ( @ 16:51)  T(F): 98, Max: 98.5 ( @ 16:51)  HR: 71 (71 - 91)  BP: 106/59 (106/59 - 162/66)  BP(mean): 94 (85 - 94)  RR: 16 (14 - 18)  SpO2: 100% (94% - 100%)  PHYSICAL EXAM:    GENERAL: NAD, well-groomed, well-developed  HEAD:  Atraumatic, Normocephalic  EYES: EOMI, PERRLA, conjunctiva and sclera clear  NECK: Supple, No JVD, Normal thyroid  NERVOUS SYSTEM:  Alert & Oriented X3, no focal deficit  CHEST/LUNG: CTA b/l ,  no  rales, rhonchi, wheezing, or rubs  HEART: Regular rate and rhythm; No murmurs, rubs, or gallops  ABDOMEN: Soft, Nontender, Nondistended; Bowel sounds present  EXTREMITIES:  2+ Peripheral Pulses, No clubbing, cyanosis, or edema , L knee  GOYO dressing + , C/D/I  LYMPH: No lymphadenopathy noted  SKIN: No rashes or lesions

## 2017-01-19 ENCOUNTER — OTHER (OUTPATIENT)
Age: 62
End: 2017-01-19

## 2017-01-19 VITALS
OXYGEN SATURATION: 96 % | RESPIRATION RATE: 18 BRPM | HEART RATE: 77 BPM | TEMPERATURE: 98 F | DIASTOLIC BLOOD PRESSURE: 55 MMHG | SYSTOLIC BLOOD PRESSURE: 115 MMHG

## 2017-01-19 LAB
ANION GAP SERPL CALC-SCNC: 14 MMOL/L — SIGNIFICANT CHANGE UP (ref 5–17)
BUN SERPL-MCNC: 12 MG/DL — SIGNIFICANT CHANGE UP (ref 8–20)
CALCIUM SERPL-MCNC: 8.8 MG/DL — SIGNIFICANT CHANGE UP (ref 8.6–10.2)
CHLORIDE SERPL-SCNC: 99 MMOL/L — SIGNIFICANT CHANGE UP (ref 98–107)
CO2 SERPL-SCNC: 25 MMOL/L — SIGNIFICANT CHANGE UP (ref 22–29)
CREAT SERPL-MCNC: 1.3 MG/DL — SIGNIFICANT CHANGE UP (ref 0.5–1.3)
GLUCOSE SERPL-MCNC: 120 MG/DL — HIGH (ref 70–115)
HCT VFR BLD CALC: 30.1 % — LOW (ref 42–52)
HGB BLD-MCNC: 10.3 G/DL — LOW (ref 14–18)
MCHC RBC-ENTMCNC: 31.1 PG — HIGH (ref 27–31)
MCHC RBC-ENTMCNC: 34.2 G/DL — SIGNIFICANT CHANGE UP (ref 32–36)
MCV RBC AUTO: 90.9 FL — SIGNIFICANT CHANGE UP (ref 80–94)
PLATELET # BLD AUTO: 213 K/UL — SIGNIFICANT CHANGE UP (ref 150–400)
POTASSIUM SERPL-MCNC: 3.5 MMOL/L — SIGNIFICANT CHANGE UP (ref 3.5–5.3)
POTASSIUM SERPL-SCNC: 3.5 MMOL/L — SIGNIFICANT CHANGE UP (ref 3.5–5.3)
RBC # BLD: 3.31 M/UL — LOW (ref 4.6–6.2)
RBC # FLD: 13.1 % — SIGNIFICANT CHANGE UP (ref 11–15.6)
SODIUM SERPL-SCNC: 138 MMOL/L — SIGNIFICANT CHANGE UP (ref 135–145)
WBC # BLD: 10.87 K/UL — HIGH (ref 4.8–10.8)
WBC # FLD AUTO: 10.87 K/UL — HIGH (ref 4.8–10.8)

## 2017-01-19 PROCEDURE — 99232 SBSQ HOSP IP/OBS MODERATE 35: CPT

## 2017-01-19 RX ORDER — OXYCODONE 5 MG/1
5 TABLET ORAL
Qty: 30 | Refills: 0 | Status: ACTIVE | COMMUNITY
Start: 2017-01-19 | End: 1900-01-01

## 2017-01-19 RX ORDER — KETOROLAC TROMETHAMINE 30 MG/ML
30 SYRINGE (ML) INJECTION ONCE
Qty: 0 | Refills: 0 | Status: DISCONTINUED | OUTPATIENT
Start: 2017-01-19 | End: 2017-01-19

## 2017-01-19 RX ADMIN — Medication 50 MILLIGRAM(S): at 04:57

## 2017-01-19 RX ADMIN — Medication 1 TABLET(S): at 12:35

## 2017-01-19 RX ADMIN — ZOLPIDEM TARTRATE 5 MILLIGRAM(S): 10 TABLET ORAL at 00:44

## 2017-01-19 RX ADMIN — OXYCODONE HYDROCHLORIDE 10 MILLIGRAM(S): 5 TABLET ORAL at 04:59

## 2017-01-19 RX ADMIN — Medication 1 PATCH: at 14:21

## 2017-01-19 RX ADMIN — SODIUM CHLORIDE 3 MILLILITER(S): 9 INJECTION INTRAMUSCULAR; INTRAVENOUS; SUBCUTANEOUS at 04:54

## 2017-01-19 RX ADMIN — Medication 325 MILLIGRAM(S): at 09:05

## 2017-01-19 RX ADMIN — OXYCODONE HYDROCHLORIDE 10 MILLIGRAM(S): 5 TABLET ORAL at 05:30

## 2017-01-19 RX ADMIN — OXYCODONE HYDROCHLORIDE 10 MILLIGRAM(S): 5 TABLET ORAL at 12:44

## 2017-01-19 RX ADMIN — OXYCODONE HYDROCHLORIDE 10 MILLIGRAM(S): 5 TABLET ORAL at 13:33

## 2017-01-19 RX ADMIN — OXYCODONE HYDROCHLORIDE 10 MILLIGRAM(S): 5 TABLET ORAL at 04:57

## 2017-01-19 RX ADMIN — Medication 975 MILLIGRAM(S): at 04:57

## 2017-01-19 RX ADMIN — OXYCODONE HYDROCHLORIDE 10 MILLIGRAM(S): 5 TABLET ORAL at 00:44

## 2017-01-19 RX ADMIN — Medication 1 MILLIGRAM(S): at 12:35

## 2017-01-19 RX ADMIN — CELECOXIB 200 MILLIGRAM(S): 200 CAPSULE ORAL at 04:58

## 2017-01-19 RX ADMIN — Medication 325 MILLIGRAM(S): at 12:35

## 2017-01-19 RX ADMIN — CELECOXIB 200 MILLIGRAM(S): 200 CAPSULE ORAL at 04:57

## 2017-01-19 RX ADMIN — Medication 100 MILLIGRAM(S): at 13:32

## 2017-01-19 RX ADMIN — Medication 100 MILLIGRAM(S): at 04:56

## 2017-01-19 RX ADMIN — Medication 1 PATCH: at 12:35

## 2017-01-19 RX ADMIN — SODIUM CHLORIDE 3 MILLILITER(S): 9 INJECTION INTRAMUSCULAR; INTRAVENOUS; SUBCUTANEOUS at 13:33

## 2017-01-19 RX ADMIN — Medication 975 MILLIGRAM(S): at 04:58

## 2017-01-19 RX ADMIN — OXYCODONE HYDROCHLORIDE 10 MILLIGRAM(S): 5 TABLET ORAL at 01:30

## 2017-01-19 RX ADMIN — Medication 975 MILLIGRAM(S): at 13:33

## 2017-01-19 RX ADMIN — Medication 975 MILLIGRAM(S): at 14:21

## 2017-01-19 RX ADMIN — Medication 325 MILLIGRAM(S): at 04:58

## 2017-01-19 RX ADMIN — OXYCODONE HYDROCHLORIDE 10 MILLIGRAM(S): 5 TABLET ORAL at 04:58

## 2017-01-19 NOTE — PROGRESS NOTE ADULT - ASSESSMENT
HPI: 62 y/o male with h/o left knee osteoarthritis x ray shows DJD patient now S/P L TKA , POD #2    Problem/Recommendation - 1:  Problem: Primary osteoarthritis of left knee. Recommendation: S/P L TKA , POD #2.    Problem/Recommendation - 2:  ·  Problem: Status post total left knee replacement.  Recommendation: PT/OT/pain mgmt  DVT prophylaxis- as per ortho  Abx as per SCIP  Incentive spirometry  Prophylaxis of opoid induced constipation.     Problem/Recommendation - 3:  ·  Problem: HUNTER (obstructive sleep apnea).  Recommendation: not on CPAP ,  at night.     Problem/Recommendation - 4:  ·  Problem: Gastroesophageal reflux disease, esophagitis presence not specified.  Recommendation: continue PPI.     Problem/Recommendation - 5:  ·  Problem: Essential hypertension.  Recommendation: Continue home meds with parameters.     Problem/Recommendation - 6:  Problem: Acute blood loss as cause of postoperative anemia. Recommendation: ABLA- asymptomatic , on PO Iron    Problem/Recommendation - 7:  Problem: Leukocytosis, unspecified type. Recommendation: Likely reactive , resolving    Problem/Recommendation - 8:  Problem: Bipolar 1 disorder. Recommendation: not on any home meds.    Problem/Recommendation - 9:  Problem: Prerenal azotemia. Recommendation: resolved with ivf , will d/c ivf , increase PO fluids    Problem/Recommendation - 10:  Problem: Smoker. Recommendation: on Nicotin patch.

## 2017-01-19 NOTE — PROGRESS NOTE ADULT - SUBJECTIVE AND OBJECTIVE BOX
Patient seen and examined . S/P L TKA , POD #2 , pain well controlled , c/o mild HA , no n/v    HPI: 60 y/o male with h/o left knee pain/ osteoarthritis  for about 20 yrs , now pain worst ,x ray shows DJD , now S/P L TKA , POD #1    CC: l knee pain - well controlled with pain meds , mild HA =    PAST MEDICAL & SURGICAL HISTORY:  History of lipoma  Bipolar 1 disorder  HUNTER (obstructive sleep apnea)  HUNTER on CPAP  Insomnia due to stress  GERD (gastroesophageal reflux disease)  Hypertension  H/O left inguinal hernia repair: 1966  History of lumbar laminectomy: 2008      MEDICATIONS  (STANDING):  sodium chloride 0.9% lock flush 3milliLiter(s) IV Push every 8 hours  tranexamic acid IVPB 850milliGRAM(s) IV Intermittent once  tranexamic acid IVPB 850milliGRAM(s) IV Intermittent once  BUpivacaine liposome 1.3% Injectable 20milliLiter(s) Local Injection once  acetaminophen  IVPB. 1000milliGRAM(s) IV Intermittent once  ceFAZolin   IVPB 2000milliGRAM(s) IV Intermittent once  aspirin enteric coated 325milliGRAM(s) Oral two times a day  acetaminophen   Tablet. 975milliGRAM(s) Oral every 8 hours  docusate sodium 100milliGRAM(s) Oral three times a day  ferrous    sulfate 325milliGRAM(s) Oral three times a day with meals  folic acid 1milliGRAM(s) Oral daily  multivitamin 1Tablet(s) Oral daily  oxyCODONE ER Tablet 10milliGRAM(s) Oral every 12 hours  metoprolol 50milliGRAM(s) Oral daily  famotidine    Tablet 40milliGRAM(s) Oral at bedtime  nicotine -  14 mG/24Hr(s) Patch 1patch Transdermal daily  influenza   Vaccine 0.5milliLiter(s) IntraMuscular once  lactated ringers. 1000milliLiter(s) IV Continuous <Continuous>  ketorolac   Injectable 30milliGRAM(s) IV Push once    MEDICATIONS  (PRN):  acetaminophen   Tablet 650milliGRAM(s) Oral every 6 hours PRN For Temp over 38.3 C (100.94 F)  oxyCODONE IR 5milliGRAM(s) Oral every 3 hours PRN Mild Pain  oxyCODONE IR 10milliGRAM(s) Oral every 3 hours PRN Moderate Pain  HYDROmorphone  Injectable 0.5milliGRAM(s) IV Push every 4 hours PRN Severe Pain/breakthough pain  aluminum hydroxide/magnesium hydroxide/simethicone Suspension 30milliLiter(s) Oral four times a day PRN Indigestion  ondansetron Injectable 4milliGRAM(s) IV Push every 6 hours PRN Nausea and/or Vomiting  magnesium hydroxide Suspension 30milliLiter(s) Oral daily PRN Constipation  senna 2Tablet(s) Oral at bedtime PRN Constipation  zolpidem 5milliGRAM(s) Oral at bedtime PRN Insomnia  zolpidem 5milliGRAM(s) Oral at bedtime PRN Insomnia  calcium carbonate 500 mG (Tums) Chewable 2Tablet(s) Chew every 4 hours PRN Indigestion      LABS:                          10.3   10.87 )-----------( 213      ( 19 Jan 2017 06:21 )             30.1     19 Jan 2017 06:21    138    |  99     |  12.0   ----------------------------<  120    3.5     |  25.0   |  1.30     Ca    8.8        19 Jan 2017 06:21          REVIEW OF SYSTEMS:    CONSTITUTIONAL: No fever, weight loss, or fatigue  EYES: No eye pain, visual disturbances, or discharge  ENMT:  No difficulty hearing, tinnitus, vertigo; No sinus or throat pain  NECK: No pain or stiffness  RESPIRATORY: No cough, wheezing, chills or hemoptysis; No shortness of breath  CARDIOVASCULAR: No chest pain, palpitations, dizziness, or leg swelling  GASTROINTESTINAL: No abdominal or epigastric pain. No nausea, vomiting, or hematemesis; No diarrhea or constipation. No melena or hematochezia.  GENITOURINARY: No dysuria, frequency, hematuria, or incontinence  NEUROLOGICAL: No headaches, memory loss, loss of strength, numbness, or tremors  SKIN: No itching, burning, rashes, or lesions   LYMPH NODES: No enlarged glands  ENDOCRINE: No heat or cold intolerance; No hair loss  MUSCULOSKELETAL: L knee pain   PSYCHIATRIC: No depression, anxiety, mood swings, or difficulty sleeping  HEME/LYMPH: No easy bruising, or bleeding gums  ALLERGY AND IMMUNOLOGIC: No hives or eczema    Vital Signs Last 24 Hrs  T(C): 36.9, Max: 37 (01-18 @ 15:40)  T(F): 98.5, Max: 98.6 (01-18 @ 15:40)  HR: 80 (80 - 95)  BP: 124/69 (122/71 - 128/64)  BP(mean): --  RR: 18 (18 - 18)  SpO2: 94% (94% - 98%)  PHYSICAL EXAM:    GENERAL: NAD, well-groomed, well-developed  HEAD:  Atraumatic, Normocephalic  EYES: EOMI, PERRLA, conjunctiva and sclera clear  NECK: Supple, No JVD, Normal thyroid  NERVOUS SYSTEM:  Alert & Oriented X3, no focal deficit  CHEST/LUNG: CTA b/l ,  no  rales, rhonchi, wheezing, or rubs  HEART: Regular rate and rhythm; No murmurs, rubs, or gallops  ABDOMEN: Soft, Nontender, Nondistended; Bowel sounds present  EXTREMITIES:  2+ Peripheral Pulses, No clubbing, cyanosis, or edema , L  knee dressing + , C/D/I  LYMPH: No lymphadenopathy noted  SKIN: No rashes or lesions Patient seen and examined . S/P L TKA , POD #2 , pain well controlled , c/o mild HA , no n/v    HPI: 62 y/o male with h/o left knee pain/ osteoarthritis  for about 20 yrs ,  pain worst , x ray shows DJD , now S/P L TKA , POD #2    CC: L  knee pain - well controlled with pain meds , mild HA +    PAST MEDICAL & SURGICAL HISTORY:    History of lipoma  Bipolar 1 disorder  HUNTER (obstructive sleep apnea)  HUNTER on CPAP  Insomnia due to stress  GERD (gastroesophageal reflux disease)  Hypertension  H/O left inguinal hernia repair: 1966  History of lumbar laminectomy: 2008      MEDICATIONS  (STANDING):  sodium chloride 0.9% lock flush 3milliLiter(s) IV Push every 8 hours  tranexamic acid IVPB 850milliGRAM(s) IV Intermittent once  tranexamic acid IVPB 850milliGRAM(s) IV Intermittent once  BUpivacaine liposome 1.3% Injectable 20milliLiter(s) Local Injection once  acetaminophen  IVPB. 1000milliGRAM(s) IV Intermittent once  ceFAZolin   IVPB 2000milliGRAM(s) IV Intermittent once  aspirin enteric coated 325milliGRAM(s) Oral two times a day  acetaminophen   Tablet. 975milliGRAM(s) Oral every 8 hours  docusate sodium 100milliGRAM(s) Oral three times a day  ferrous    sulfate 325milliGRAM(s) Oral three times a day with meals  folic acid 1milliGRAM(s) Oral daily  multivitamin 1Tablet(s) Oral daily  oxyCODONE ER Tablet 10milliGRAM(s) Oral every 12 hours  metoprolol 50milliGRAM(s) Oral daily  famotidine    Tablet 40milliGRAM(s) Oral at bedtime  nicotine -  14 mG/24Hr(s) Patch 1patch Transdermal daily  influenza   Vaccine 0.5milliLiter(s) IntraMuscular once  lactated ringers. 1000milliLiter(s) IV Continuous <Continuous>  ketorolac   Injectable 30milliGRAM(s) IV Push once    MEDICATIONS  (PRN):  acetaminophen   Tablet 650milliGRAM(s) Oral every 6 hours PRN For Temp over 38.3 C (100.94 F)  oxyCODONE IR 5milliGRAM(s) Oral every 3 hours PRN Mild Pain  oxyCODONE IR 10milliGRAM(s) Oral every 3 hours PRN Moderate Pain  HYDROmorphone  Injectable 0.5milliGRAM(s) IV Push every 4 hours PRN Severe Pain/breakthough pain  aluminum hydroxide/magnesium hydroxide/simethicone Suspension 30milliLiter(s) Oral four times a day PRN Indigestion  ondansetron Injectable 4milliGRAM(s) IV Push every 6 hours PRN Nausea and/or Vomiting  magnesium hydroxide Suspension 30milliLiter(s) Oral daily PRN Constipation  senna 2Tablet(s) Oral at bedtime PRN Constipation  zolpidem 5milliGRAM(s) Oral at bedtime PRN Insomnia  zolpidem 5milliGRAM(s) Oral at bedtime PRN Insomnia  calcium carbonate 500 mG (Tums) Chewable 2Tablet(s) Chew every 4 hours PRN Indigestion      LABS:                          10.3   10.87 )-----------( 213      ( 19 Jan 2017 06:21 )             30.1     19 Jan 2017 06:21    138    |  99     |  12.0   ----------------------------<  120    3.5     |  25.0   |  1.30     Ca    8.8        19 Jan 2017 06:21          REVIEW OF SYSTEMS:    CONSTITUTIONAL: No fever, weight loss, or fatigue  EYES: No eye pain, visual disturbances, or discharge  ENMT:  No difficulty hearing, tinnitus, vertigo; No sinus or throat pain  NECK: No pain or stiffness  RESPIRATORY: No cough, wheezing, chills or hemoptysis; No shortness of breath  CARDIOVASCULAR: No chest pain, palpitations, dizziness, or leg swelling  GASTROINTESTINAL: No abdominal or epigastric pain. No nausea, vomiting, or hematemesis; No diarrhea or constipation. No melena or hematochezia.  GENITOURINARY: No dysuria, frequency, hematuria, or incontinence  NEUROLOGICAL: No headaches, memory loss, loss of strength, numbness, or tremors  SKIN: No itching, burning, rashes, or lesions   LYMPH NODES: No enlarged glands  ENDOCRINE: No heat or cold intolerance; No hair loss  MUSCULOSKELETAL: L knee pain   PSYCHIATRIC: No depression, anxiety, mood swings, or difficulty sleeping  HEME/LYMPH: No easy bruising, or bleeding gums  ALLERGY AND IMMUNOLOGIC: No hives or eczema    Vital Signs Last 24 Hrs  T(C): 36.9, Max: 37 (01-18 @ 15:40)  T(F): 98.5, Max: 98.6 (01-18 @ 15:40)  HR: 80 (80 - 95)  BP: 124/69 (122/71 - 128/64)  BP(mean): --  RR: 18 (18 - 18)  SpO2: 94% (94% - 98%)  PHYSICAL EXAM:    GENERAL: NAD, well-groomed, well-developed  HEAD:  Atraumatic, Normocephalic  EYES: EOMI, PERRLA, conjunctiva and sclera clear  NECK: Supple, No JVD, Normal thyroid  NERVOUS SYSTEM:  Alert & Oriented X3, no focal deficit  CHEST/LUNG: CTA b/l ,  no  rales, rhonchi, wheezing, or rubs  HEART: Regular rate and rhythm; No murmurs, rubs, or gallops  ABDOMEN: Soft, Nontender, Nondistended; Bowel sounds present  EXTREMITIES:  2+ Peripheral Pulses, No clubbing, cyanosis, or edema , L  knee dressing + , C/D/I  LYMPH: No lymphadenopathy noted  SKIN: No rashes or lesions

## 2017-01-19 NOTE — PROGRESS NOTE ADULT - SUBJECTIVE AND OBJECTIVE BOX
JENNIFER MENDES  [id number]    History: 61y Male is status post left total knee arthroplasty, POD # 2. Patient is doing well and is comfortable. The patient's pain is controlled using the prescribed pain medications now. He complains of pain when OOB with PT, improves slightly with pain meds. The patient is participating in physical therapy. Denies nausea, vomiting, chest pain, shortness of breath, abdominal pain or fever. No new complaints.                            10.3   10.87 )-----------( 213      ( 19 Jan 2017 06:21 )             30.1     19 Jan 2017 06:21    138    |  99     |  12.0   ----------------------------<  120    3.5     |  25.0   |  1.30     Ca    8.8        19 Jan 2017 06:21        MEDICATIONS  (STANDING):  sodium chloride 0.9% lock flush 3milliLiter(s) IV Push every 8 hours  tranexamic acid IVPB 850milliGRAM(s) IV Intermittent once  tranexamic acid IVPB 850milliGRAM(s) IV Intermittent once  BUpivacaine liposome 1.3% Injectable 20milliLiter(s) Local Injection once  acetaminophen  IVPB. 1000milliGRAM(s) IV Intermittent once  ceFAZolin   IVPB 2000milliGRAM(s) IV Intermittent once  aspirin enteric coated 325milliGRAM(s) Oral two times a day  acetaminophen   Tablet. 975milliGRAM(s) Oral every 8 hours  celecoxib 200milliGRAM(s) Oral two times a day before meals  docusate sodium 100milliGRAM(s) Oral three times a day  ferrous    sulfate 325milliGRAM(s) Oral three times a day with meals  folic acid 1milliGRAM(s) Oral daily  multivitamin 1Tablet(s) Oral daily  oxyCODONE ER Tablet 10milliGRAM(s) Oral every 12 hours  metoprolol 50milliGRAM(s) Oral daily  famotidine    Tablet 40milliGRAM(s) Oral at bedtime  nicotine -  14 mG/24Hr(s) Patch 1patch Transdermal daily  influenza   Vaccine 0.5milliLiter(s) IntraMuscular once  lactated ringers. 1000milliLiter(s) IV Continuous <Continuous>    MEDICATIONS  (PRN):  acetaminophen   Tablet 650milliGRAM(s) Oral every 6 hours PRN For Temp over 38.3 C (100.94 F)  oxyCODONE IR 5milliGRAM(s) Oral every 3 hours PRN Mild Pain  oxyCODONE IR 10milliGRAM(s) Oral every 3 hours PRN Moderate Pain  HYDROmorphone  Injectable 0.5milliGRAM(s) IV Push every 4 hours PRN Severe Pain/breakthough pain  aluminum hydroxide/magnesium hydroxide/simethicone Suspension 30milliLiter(s) Oral four times a day PRN Indigestion  ondansetron Injectable 4milliGRAM(s) IV Push every 6 hours PRN Nausea and/or Vomiting  magnesium hydroxide Suspension 30milliLiter(s) Oral daily PRN Constipation  senna 2Tablet(s) Oral at bedtime PRN Constipation  zolpidem 5milliGRAM(s) Oral at bedtime PRN Insomnia  zolpidem 5milliGRAM(s) Oral at bedtime PRN Insomnia  calcium carbonate 500 mG (Tums) Chewable 2Tablet(s) Chew every 4 hours PRN Indigestion    Vital Signs Last 24 Hrs  T(C): 36.9, Max: 37 (01-18 @ 15:40)  T(F): 98.5, Max: 98.6 (01-18 @ 15:40)  HR: 80 (70 - 95)  BP: 124/69 (122/71 - 128/64)  BP(mean): --  RR: 18 (18 - 18)  SpO2: 94% (94% - 98%)    Physical exam: Left lower extremity- The left knee dressing is clean, dry and intact. Prineo is dry and intact. No drainage or discharge. No erythema is noted. No blistering. No ecchymosis. The calf is supple nontender. Sensation to light touch is grossly intact distally. Motor function distally is 5/5. Extensor hallucis longus and flexor hallucis longus are intact. No foot drop. 2+ dorsalis pedis pulse. Capillary refill is less than 2 seconds. No cyanosis.    Primary Orthopedic Assessment:  •	s/p LEFT complex total knee replacement, POD#2  	    Plan:   -           Dsg changed left knee  •	DVT prophylaxis- asa 325mg bid, including use of compression devices and ankle pumps  •	Continue physical therapy  •	Weightbearing as tolerated of the right lower extremity with assistance of a walker  •	Incentive spirometry encouraged  •	Pain control   •	Discharge planning – anticipated discharge is subacute rehabilitation

## 2017-01-19 NOTE — PROGRESS NOTE ADULT - SUBJECTIVE AND OBJECTIVE BOX
61 year old Male, S/P Left Total Knee Arthroplasty under [facility] on 1/18/17. Patient is awake and alert. Vital signs are stable. Patient had some nausea, vomiting yesterday, which had improved today. Patient had no other complaints, no complications with regard to Anesthesia Care.

## 2017-01-24 LAB — SURGICAL PATHOLOGY FINAL REPORT - CH: SIGNIFICANT CHANGE UP

## 2017-02-01 ENCOUNTER — OTHER (OUTPATIENT)
Age: 62
End: 2017-02-01

## 2017-02-03 ENCOUNTER — OTHER (OUTPATIENT)
Age: 62
End: 2017-02-03

## 2017-02-06 ENCOUNTER — APPOINTMENT (OUTPATIENT)
Dept: ORTHOPEDIC SURGERY | Facility: CLINIC | Age: 62
End: 2017-02-06

## 2017-02-06 VITALS
BODY MASS INDEX: 30.61 KG/M2 | HEART RATE: 86 BPM | HEIGHT: 67 IN | TEMPERATURE: 97.9 F | SYSTOLIC BLOOD PRESSURE: 129 MMHG | WEIGHT: 195 LBS | DIASTOLIC BLOOD PRESSURE: 83 MMHG

## 2017-03-07 ENCOUNTER — APPOINTMENT (OUTPATIENT)
Dept: ORTHOPEDIC SURGERY | Facility: CLINIC | Age: 62
End: 2017-03-07

## 2017-03-07 VITALS
BODY MASS INDEX: 30.61 KG/M2 | TEMPERATURE: 97.9 F | DIASTOLIC BLOOD PRESSURE: 77 MMHG | HEIGHT: 67 IN | SYSTOLIC BLOOD PRESSURE: 136 MMHG | HEART RATE: 73 BPM | WEIGHT: 195 LBS

## 2017-03-07 RX ORDER — OXYCODONE 5 MG/1
5 TABLET ORAL EVERY 8 HOURS
Qty: 60 | Refills: 0 | Status: ACTIVE | COMMUNITY
Start: 2017-03-07 | End: 1900-01-01

## 2017-05-22 ENCOUNTER — APPOINTMENT (OUTPATIENT)
Dept: ORTHOPEDIC SURGERY | Facility: CLINIC | Age: 62
End: 2017-05-22

## 2017-07-10 ENCOUNTER — APPOINTMENT (OUTPATIENT)
Dept: ORTHOPEDIC SURGERY | Facility: CLINIC | Age: 62
End: 2017-07-10

## 2017-07-10 VITALS
HEART RATE: 61 BPM | SYSTOLIC BLOOD PRESSURE: 114 MMHG | WEIGHT: 190 LBS | HEIGHT: 67 IN | DIASTOLIC BLOOD PRESSURE: 63 MMHG | TEMPERATURE: 98.2 F | BODY MASS INDEX: 29.82 KG/M2

## 2017-07-10 DIAGNOSIS — Z96.652 PRESENCE OF LEFT ARTIFICIAL KNEE JOINT: ICD-10-CM

## 2017-07-10 DIAGNOSIS — Z96.652 AFTERCARE FOLLOWING JOINT REPLACEMENT SURGERY: ICD-10-CM

## 2017-07-10 DIAGNOSIS — Z47.1 AFTERCARE FOLLOWING JOINT REPLACEMENT SURGERY: ICD-10-CM

## 2017-07-23 PROCEDURE — 88311 DECALCIFY TISSUE: CPT

## 2017-07-23 PROCEDURE — 97163 PT EVAL HIGH COMPLEX 45 MIN: CPT

## 2017-07-23 PROCEDURE — 85027 COMPLETE CBC AUTOMATED: CPT

## 2017-07-23 PROCEDURE — C1713: CPT

## 2017-07-23 PROCEDURE — 97110 THERAPEUTIC EXERCISES: CPT

## 2017-07-23 PROCEDURE — 73560 X-RAY EXAM OF KNEE 1 OR 2: CPT

## 2017-07-23 PROCEDURE — 97116 GAIT TRAINING THERAPY: CPT

## 2017-07-23 PROCEDURE — C1776: CPT

## 2017-07-23 PROCEDURE — 88305 TISSUE EXAM BY PATHOLOGIST: CPT

## 2017-07-23 PROCEDURE — 86850 RBC ANTIBODY SCREEN: CPT

## 2017-07-23 PROCEDURE — 86900 BLOOD TYPING SEROLOGIC ABO: CPT

## 2017-07-23 PROCEDURE — 36415 COLL VENOUS BLD VENIPUNCTURE: CPT

## 2017-07-23 PROCEDURE — 80048 BASIC METABOLIC PNL TOTAL CA: CPT

## 2017-07-23 PROCEDURE — 86901 BLOOD TYPING SEROLOGIC RH(D): CPT

## 2017-08-18 ENCOUNTER — APPOINTMENT (OUTPATIENT)
Dept: ORTHOPEDIC SURGERY | Facility: CLINIC | Age: 62
End: 2017-08-18
